# Patient Record
Sex: FEMALE | URBAN - METROPOLITAN AREA
[De-identification: names, ages, dates, MRNs, and addresses within clinical notes are randomized per-mention and may not be internally consistent; named-entity substitution may affect disease eponyms.]

---

## 2018-06-01 ENCOUNTER — APPOINTMENT (RX ONLY)
Dept: URBAN - METROPOLITAN AREA CLINIC 23 | Facility: CLINIC | Age: 58
Setting detail: DERMATOLOGY
End: 2018-06-01

## 2018-06-01 DIAGNOSIS — Z41.9 ENCOUNTER FOR PROCEDURE FOR PURPOSES OTHER THAN REMEDYING HEALTH STATE, UNSPECIFIED: ICD-10-CM

## 2018-06-01 PROCEDURE — ? DYSPORT

## 2018-06-01 PROCEDURE — ? PRESCRIPTION SAMPLES PROVIDED

## 2018-06-01 PROCEDURE — ? FILLERS

## 2018-06-01 PROCEDURE — ? IN-HOUSE DISPENSING PHARMACY

## 2018-06-01 NOTE — PROCEDURE: PRESCRIPTION SAMPLES PROVIDED
Detail Level: Detailed
Samples Given: [employer]
Expiration Date (Optional): Dec.2018
Lot/Batch Number (Optional): [id number]

## 2018-06-01 NOTE — PROCEDURE: DYSPORT
Additional Area 1 Location: chin
Additional Area 4 Units: 0
Post-Care Instructions: Patient instructed to not lie down for 4 hours and limit physical activity for 24 hours. Patient instructed not to travel by airplane for 48 hours.
Inferior Lateral Orbicularis Oculi Units: [id number] Raffi Persaud
Detail Level: Simple
Additional Area 2 Location: high forehead 2cm above eyebrows
Additional Area 3 Location: lateral eyes
Consent: Written consent obtained. Risks include but not limited to lid/brow ptosis, bruising, swelling, diplopia, temporary effect, incomplete chemical denervation.
Glabellar Complex Units: 27
Lot #: [id number]
Dilution (U/ 0.1cc): 1.5
Expiration Date (Month Year): 11/30/18
Additional Area 5 Location: lateral brows
Additional Area 4 Location: vermillion lips

## 2018-06-01 NOTE — PROCEDURE: FILLERS
Expiration Date (Month Year): 12/16/2019
Decollete Filler  Volume In Cc: 0
Detail Level: Detailed
Additional Area 1 Location: oral commissures
Use Map Statement For Sites (Optional): No
Anesthesia Volume In Cc: 0.5
Post-Care Instructions: Patient instructed to apply ice to reduce swelling.
Include Cannula Information In Note?: Yes
Map Statment: See [address] for Complete Details
Expiration Date (Month Year): 12/2020
Price (Use Numbers Only, No Special Characters Or $): 0.00
Lot #: [id number]
Filler: Juvederm Vollure XC
Consent: Written consent obtained. Risks include but not limited to bruising, beading, irregular texture, ulceration, infection, allergic reaction, scar formation, incomplete augmentation, temporary nature, procedural pain.
Additional Area 1 Volume In Cc: 1
Additional Area 1 Location: cheeks and vermilion board
Additional Area 2 Location: fine lines (upper lip), oral commisures, marionette lines
Lot #: [id number]

## 2018-06-01 NOTE — PROCEDURE: IN-HOUSE DISPENSING PHARMACY
Product 77 Price/Unit (In Dollars): 0
Product 23 Unit Type: mg
Product 2 Amount/Unit (Numbers Only): 1
Product 6 Refills: 2
Product 8 Unit Type: ml
Product 4 Application Directions: Take 2 per day for 3 days
Name Of Product 1: Hydroquinone pads 6%
Detail Level: Zone
Product 1 Application Directions: apply 1 pad to face am and pm
Product 1 Unit Type: jar(s)
Product 8 Application Directions: Apply to eyelashes nightly
Name Of Product 8: Latisse 3ML
Product 4 Unit Type: capsules
Name Of Product 4: Valcyclovir 500mg
Product 9 Application Directions: Take 1 tablet now in office and repeat one tablet tomorrow
Product 8 Amount/Unit (Numbers Only): 3
Product 7 Amount/Unit (Numbers Only): 5
Product 5 Application Directions: Apply thin layer to skin as directed
Product 5 Unit Type: tube(s)
Product 3 Application Directions: Take 1 tablet tomorrow for swelling as needed.
Product 4 Amount/Unit (Numbers Only): 6
Product 6 Application Directions: Apply pea size amount to entire face and back at bedtime
Name Of Product 7: Diazepam
Render Product Pricing In Note: No
Product 2 Application Directions: Apply 1 pad to face 2x day
Name Of Product 9: Prednisone 20mg
Name Of Product 6: Tretinoin 0.05% cream
Product 1 Price/Unit (In Dollars): 0.00
Name Of Product 5: Hydroquinone 4% / Tretinoin 0.05% / Fluocinolone 0.01%
Name Of Product 3: Prednisone 10 mg